# Patient Record
Sex: FEMALE | Race: WHITE
[De-identification: names, ages, dates, MRNs, and addresses within clinical notes are randomized per-mention and may not be internally consistent; named-entity substitution may affect disease eponyms.]

---

## 2018-02-13 ENCOUNTER — HOSPITAL ENCOUNTER (EMERGENCY)
Dept: HOSPITAL 25 - UCEAST | Age: 33
Discharge: HOME | End: 2018-02-13
Payer: COMMERCIAL

## 2018-02-13 VITALS — DIASTOLIC BLOOD PRESSURE: 71 MMHG | SYSTOLIC BLOOD PRESSURE: 127 MMHG

## 2018-02-13 DIAGNOSIS — F41.9: ICD-10-CM

## 2018-02-13 DIAGNOSIS — R21: Primary | ICD-10-CM

## 2018-02-13 DIAGNOSIS — F32.9: ICD-10-CM

## 2018-02-13 DIAGNOSIS — F17.210: ICD-10-CM

## 2018-02-13 DIAGNOSIS — E66.9: ICD-10-CM

## 2018-02-13 LAB
BASOPHILS # BLD AUTO: 0.1 10^3/UL (ref 0–0.2)
EOSINOPHIL # BLD AUTO: 0.1 10^3/UL (ref 0–0.6)
HCT VFR BLD AUTO: 40 % (ref 35–47)
HGB BLD-MCNC: 13.8 G/DL (ref 12–16)
LYMPHOCYTES # BLD AUTO: 2.4 10^3/UL (ref 1–4.8)
MCH RBC QN AUTO: 30 PG (ref 27–31)
MCHC RBC AUTO-ENTMCNC: 34 G/DL (ref 31–36)
MCV RBC AUTO: 89 FL (ref 80–97)
MONOCYTES # BLD AUTO: 0.5 10^3/UL (ref 0–0.8)
NEUTROPHILS # BLD AUTO: 7.9 10^3/UL (ref 1.5–7.7)
NRBC # BLD AUTO: 0 10^3/UL
NRBC BLD QL AUTO: 0.1
PLATELET # BLD AUTO: 378 10^3/UL (ref 150–450)
RBC # BLD AUTO: 4.54 10^6/UL (ref 4–5.4)
WBC # BLD AUTO: 11 10^3/UL (ref 3.5–10.8)

## 2018-02-13 PROCEDURE — 87651 STREP A DNA AMP PROBE: CPT

## 2018-02-13 PROCEDURE — 99212 OFFICE O/P EST SF 10 MIN: CPT

## 2018-02-13 PROCEDURE — G0463 HOSPITAL OUTPT CLINIC VISIT: HCPCS

## 2018-02-13 PROCEDURE — 85025 COMPLETE CBC W/AUTO DIFF WBC: CPT

## 2018-02-13 PROCEDURE — 84443 ASSAY THYROID STIM HORMONE: CPT

## 2018-02-13 PROCEDURE — 36415 COLL VENOUS BLD VENIPUNCTURE: CPT

## 2018-02-13 PROCEDURE — 82533 TOTAL CORTISOL: CPT

## 2018-02-13 PROCEDURE — 80053 COMPREHEN METABOLIC PANEL: CPT

## 2018-02-13 NOTE — UC
Skin Complaint HPI





- HPI Summary


HPI Summary: 





Pt presents with itchy rash to face and upper chest. This began 2 days ago on 

her face. Tells me she woke up 2 days ago and had mild redness and itching on 

her face. This persisted all day. She took benadryl which relieved the itch and 

helped it eventually fade toward later that evening. Yesterday morning she woke 

up with the same symptoms, but they again went away with benadryl. This morning 

she woke up with the same symptoms returning and this time spreading to her 

upper chest. She denies new soaps/detergents, foods, SOB, throat/mouth/lip 

swelling, chest pain, or sore throat. 





Says that she has been feeling sick recently with "cold symptoms". Dry cough 

and some sinus congestion. No fever or sick contacts that she is aware of. She 

has an appt tomorrow with her PCP for this issue. Still smoking





- History of Current Complaint


Chief Complaint: UCSkin


Stated Complaint: REDNESS,SWELLING IN FACE


Hx Obtained From: Patient


Hx Last Menstrual Period: Uterine ablasion


Current Severity: None


Pain Intensity: 0





- Allergy/Home Medications


Allergies/Adverse Reactions: 


 Allergies











Allergy/AdvReac Type Severity Reaction Status Date / Time


 


No Known Allergies Allergy   Verified 02/13/18 11:34











Home Medications: 


 Home Medications





Gabapentin CAP(*) [Neurontin 300 CAP(*)] 900 mg PO DAILY 02/13/18 [History 

Confirmed 02/13/18]











Review of Systems


Constitutional: Negative


Skin: Rash


Eyes: Negative


ENT: Negative


Respiratory: Negative


Cardiovascular: Negative


Gastrointestinal: Negative


Motor: Negative


Neurovascular: Negative


Musculoskeletal: Negative


Neurological: Negative


Psychological: Negative


All Other Systems Reviewed And Are Negative: Yes





PMH/Surg Hx/FS Hx/Imm Hx


Previously Healthy: Yes


Psychological History: Anxiety, Depression





- Surgical History


Surgical History: Yes


Surgery Procedure, Year, and Place: 2005 & 2010 CSECTIONS X 2, [facility].  UTERINE 

ABLASION





- Family History


Known Family History: Positive: Unknown





- Social History


Occupation: Employed Full-time


Lives: With Family


Alcohol Use: Occasionally


Substance Use Type: None


Smoking Status (MU): Heavy Every Day Tobacco Smoker


Amount Used/How Often: 1/2 PPD


Length of Time of Smoking/Using Tobacco: 10+ YEARS


Have You Smoked in the Last Year: Yes


Household Exposure Type: Cigarettes





Physical Exam


Triage Information Reviewed: Yes


Appearance: Well-Appearing, No Pain Distress, Obese


Vital Signs: 


 Initial Vital Signs











Temp  97.6 F   02/13/18 11:36


 


Pulse  89   02/13/18 11:36


 


Resp  18   02/13/18 11:36


 


BP  127/71   02/13/18 11:36


 


Pulse Ox  100   02/13/18 11:36











Vital Signs Reviewed: Yes


Eyes: Positive: Conjunctiva Clear, Other: - No edema.  Negative: Conjunctiva 

Inflamed, Discharge


ENT: Positive: Hearing grossly normal, Pharynx normal, TMs normal, Uvula 

midline.  Negative: Pharyngeal erythema, Nasal congestion, Nasal drainage, TM 

bulging, TM dull, TM red, Tonsillar swelling, Tonsillar exudate, Hoarse voice, 

Sinus tenderness


Neck: Positive: Supple, Nontender, No Lymphadenopathy


Respiratory: Positive: Lungs clear, Normal breath sounds, No respiratory 

distress, No accessory muscle use


Cardiovascular: Positive: RRR, No Murmur, Pulses Normal


Neurological: Positive: Alert


Psychological: Positive: Age Appropriate Behavior


Skin: Positive: Other - Mildly erythematous rash on cheeks, chin, and anterior 

upper chest wall. No edema, pustules, nodules, streaking, discharge, bleeding, 

or ecchymosis.





Course/Dx





- Course


Course Of Treatment: POC strep negative.  Unsure the eitology of her rash, it 

most closely resembles Urticaria -- but she tells me that she has an appt 

tomorrow morning with her PCP for this rash. I will draw some labwork and try 

prednisone po and have her PCP review her labs tomorrow during her appointment.





- Diagnoses


Provider Diagnoses: Rash on chest





Discharge





- Discharge Plan


Condition: Stable


Disposition: HOME


Prescriptions: 


predniSONE TAB* [Deltasone TAB*] 50 mg PO DAILY #5 tab


Patient Education Materials:  Urticaria (ED)


Referrals: 


Octavio Grimes MD [Primary Care Provider] - 


Additional Instructions: 


If you develop a fever, shortness of breath, chest pain, new or worsening 

symptoms - please call your PCP or go to the ED.


 





1) Please follow up with your PCP tomorrow regarding your rash and blood work 

results.